# Patient Record
Sex: FEMALE | Race: WHITE | NOT HISPANIC OR LATINO | Employment: OTHER | URBAN - NONMETROPOLITAN AREA
[De-identification: names, ages, dates, MRNs, and addresses within clinical notes are randomized per-mention and may not be internally consistent; named-entity substitution may affect disease eponyms.]

---

## 2023-11-27 DIAGNOSIS — I48.0 PAROXYSMAL ATRIAL FIBRILLATION (MULTI): Primary | ICD-10-CM

## 2023-11-27 PROBLEM — I10 HTN (HYPERTENSION): Status: ACTIVE | Noted: 2023-11-27

## 2023-11-27 PROBLEM — I42.8 NON-ISCHEMIC CARDIOMYOPATHY (MULTI): Status: ACTIVE | Noted: 2023-11-27

## 2023-11-27 PROBLEM — E03.9 HYPOTHYROIDISM: Status: ACTIVE | Noted: 2023-11-27

## 2023-11-27 PROBLEM — I44.7 LBBB (LEFT BUNDLE BRANCH BLOCK): Status: ACTIVE | Noted: 2023-11-27

## 2023-11-27 PROBLEM — R94.39 ABNORMAL STRESS TEST: Status: ACTIVE | Noted: 2023-11-27

## 2023-11-27 PROBLEM — I35.0 AORTIC STENOSIS: Status: ACTIVE | Noted: 2023-11-27

## 2023-11-27 PROBLEM — Z95.2 S/P TAVR (TRANSCATHETER AORTIC VALVE REPLACEMENT): Status: ACTIVE | Noted: 2023-11-27

## 2023-11-27 PROBLEM — I26.99 PULMONARY EMBOLISM (MULTI): Status: ACTIVE | Noted: 2023-11-27

## 2023-11-27 PROBLEM — R94.31 ABNORMAL EKG: Status: ACTIVE | Noted: 2023-11-27

## 2023-11-27 RX ORDER — AMIODARONE HYDROCHLORIDE 200 MG/1
100 TABLET ORAL DAILY
COMMUNITY
Start: 2021-11-19 | End: 2023-11-27 | Stop reason: SDUPTHER

## 2023-11-27 RX ORDER — CHOLECALCIFEROL (VITAMIN D3) 25 MCG
1 TABLET ORAL DAILY
COMMUNITY

## 2023-11-27 RX ORDER — TRAZODONE HYDROCHLORIDE 50 MG/1
TABLET ORAL
COMMUNITY

## 2023-11-27 RX ORDER — WARFARIN 2.5 MG/1
2.5 TABLET ORAL
COMMUNITY

## 2023-11-27 RX ORDER — GLUCOSAMINE/CHONDR SU A SOD 750-600 MG
1 TABLET ORAL DAILY
COMMUNITY

## 2023-11-27 RX ORDER — DONEPEZIL HYDROCHLORIDE 5 MG/1
5 TABLET, ORALLY DISINTEGRATING ORAL NIGHTLY
COMMUNITY

## 2023-11-27 RX ORDER — IBUPROFEN 100 MG/5ML
1 SUSPENSION, ORAL (FINAL DOSE FORM) ORAL DAILY
COMMUNITY

## 2023-11-27 RX ORDER — GLUCOSAM/CHONDRO/HERB 149/HYAL 750-100 MG
1 TABLET ORAL DAILY
COMMUNITY

## 2023-11-27 RX ORDER — FLUTICASONE PROPIONATE 50 MCG
2 SPRAY, SUSPENSION (ML) NASAL DAILY
COMMUNITY
Start: 2022-01-10

## 2023-11-27 RX ORDER — LEVOTHYROXINE SODIUM 88 UG/1
1 TABLET ORAL DAILY
COMMUNITY
End: 2024-03-19

## 2023-11-27 RX ORDER — SACUBITRIL AND VALSARTAN 24; 26 MG/1; MG/1
1 TABLET, FILM COATED ORAL 2 TIMES DAILY
COMMUNITY
Start: 2021-11-19 | End: 2024-01-04

## 2023-11-28 RX ORDER — AMIODARONE HYDROCHLORIDE 200 MG/1
100 TABLET ORAL DAILY
Qty: 45 TABLET | Refills: 0 | Status: SHIPPED | OUTPATIENT
Start: 2023-11-28 | End: 2024-02-23

## 2023-11-30 ENCOUNTER — TELEPHONE (OUTPATIENT)
Dept: CARDIOLOGY | Facility: CLINIC | Age: 88
End: 2023-11-30
Payer: MEDICARE

## 2023-11-30 NOTE — TELEPHONE ENCOUNTER
Patient was due for Amiodarone testing at [facility] in 7/2023.  No testing not completed. Attempted to phone patient. No answer. Left detailed message on machine to call [facility] or office about testing.

## 2023-12-14 ENCOUNTER — TELEPHONE (OUTPATIENT)
Dept: CARDIOLOGY | Facility: CLINIC | Age: 88
End: 2023-12-14
Payer: MEDICARE

## 2024-01-04 DIAGNOSIS — I42.8 NON-ISCHEMIC CARDIOMYOPATHY (MULTI): ICD-10-CM

## 2024-01-04 RX ORDER — SACUBITRIL AND VALSARTAN 24; 26 MG/1; MG/1
1 TABLET, FILM COATED ORAL 2 TIMES DAILY
Qty: 60 TABLET | Refills: 0 | Status: SHIPPED | OUTPATIENT
Start: 2024-01-04 | End: 2024-02-05

## 2024-02-02 DIAGNOSIS — I42.8 NON-ISCHEMIC CARDIOMYOPATHY (MULTI): ICD-10-CM

## 2024-02-05 RX ORDER — SACUBITRIL AND VALSARTAN 24; 26 MG/1; MG/1
1 TABLET, FILM COATED ORAL 2 TIMES DAILY
Qty: 60 TABLET | Refills: 0 | Status: SHIPPED | OUTPATIENT
Start: 2024-02-05 | End: 2024-03-05

## 2024-02-21 ENCOUNTER — APPOINTMENT (OUTPATIENT)
Dept: CARDIOLOGY | Facility: CLINIC | Age: 89
End: 2024-02-21
Payer: MEDICARE

## 2024-02-21 DIAGNOSIS — I48.0 PAROXYSMAL ATRIAL FIBRILLATION (MULTI): ICD-10-CM

## 2024-02-23 RX ORDER — AMIODARONE HYDROCHLORIDE 200 MG/1
100 TABLET ORAL DAILY
Qty: 45 TABLET | Refills: 3 | Status: SHIPPED | OUTPATIENT
Start: 2024-02-23 | End: 2025-02-22

## 2024-03-04 DIAGNOSIS — I42.8 NON-ISCHEMIC CARDIOMYOPATHY (MULTI): ICD-10-CM

## 2024-03-05 RX ORDER — SACUBITRIL AND VALSARTAN 24; 26 MG/1; MG/1
1 TABLET, FILM COATED ORAL 2 TIMES DAILY
Qty: 60 TABLET | Refills: 12 | Status: SHIPPED | OUTPATIENT
Start: 2024-03-05

## 2024-03-18 DIAGNOSIS — E03.8 OTHER SPECIFIED HYPOTHYROIDISM: Primary | ICD-10-CM

## 2024-03-19 RX ORDER — LEVOTHYROXINE SODIUM 88 UG/1
88 TABLET ORAL DAILY
Qty: 90 TABLET | Refills: 0 | Status: SHIPPED | OUTPATIENT
Start: 2024-03-19

## 2024-08-12 ENCOUNTER — APPOINTMENT (OUTPATIENT)
Dept: CARDIOLOGY | Facility: HOSPITAL | Age: 89
End: 2024-08-12
Payer: MEDICARE

## 2025-07-03 ENCOUNTER — HOSPITAL ENCOUNTER (OUTPATIENT)
Dept: HOSPITAL 101 - LAB | Age: 89
Discharge: HOME | End: 2025-07-03
Payer: MEDICARE

## 2025-07-03 DIAGNOSIS — R30.0: Primary | ICD-10-CM

## 2025-07-03 LAB
AMORPH SED URNS QL MICRO: (no result)
BACTERIA URINE: (no result) #/HPF
BILIRUBIN URINE: NEGATIVE
CAST SEEN?: (no result) #/LPF
CLARITY UR: CLEAR
COARSE GRAN CASTS URNS QL MICRO: (no result)
COLOR UR: (no result)
GLUCOSE URINE UA: NEGATIVE MG/DL
HGB UR QL: (no result)
KETONES URINE: NEGATIVE MG/DL
LEUKOCYTE ESTERASE UR QL: NEGATIVE
Lab: (no result)
Lab: (no result) #/HPF
Lab: YES
MUCUS URINE: (no result)
NITRITE URINE: POSITIVE
PH UR: 6 [PH] (ref 5–9)
PROTEIN URINE: NEGATIVE MG/DL
RBC URINE: (no result) #/HPF (ref 0–2)
SPECIFIC GRAVITY URINE: 1.01 (ref 1–1.02)
SPERM # UR AUTO: (no result) /UL
SQUAMOUS EPITHELIAL CELL URINE: (no result) #/LPF
URINE CULTURE INDICATED: (no result)
UROBILINOGEN UR QL: 0.2 EU/DL (ref 0.2–1)
WBC URINE: (no result) #/HPF

## 2025-07-03 PROCEDURE — 81001 URINALYSIS AUTO W/SCOPE: CPT

## 2025-07-03 PROCEDURE — 87088 URINE BACTERIA CULTURE: CPT

## 2025-07-03 PROCEDURE — 87086 URINE CULTURE/COLONY COUNT: CPT

## 2025-07-03 PROCEDURE — 87186 SC STD MICRODIL/AGAR DIL: CPT
